# Patient Record
Sex: FEMALE | Race: WHITE | HISPANIC OR LATINO | Employment: OTHER | URBAN - METROPOLITAN AREA
[De-identification: names, ages, dates, MRNs, and addresses within clinical notes are randomized per-mention and may not be internally consistent; named-entity substitution may affect disease eponyms.]

---

## 2017-03-27 ENCOUNTER — INITIAL CONSULT (OUTPATIENT)
Dept: OPHTHALMOLOGY | Facility: CLINIC | Age: 52
End: 2017-03-27
Payer: MEDICAID

## 2017-03-27 DIAGNOSIS — J01.10 ACUTE NON-RECURRENT FRONTAL SINUSITIS: ICD-10-CM

## 2017-03-27 PROCEDURE — 99999 PR PBB SHADOW E&M-NEW PATIENT-LVL II: CPT | Mod: PBBFAC,,, | Performed by: OPHTHALMOLOGY

## 2017-03-27 PROCEDURE — 92004 COMPRE OPH EXAM NEW PT 1/>: CPT | Mod: S$PBB,,, | Performed by: OPHTHALMOLOGY

## 2017-03-27 PROCEDURE — 99202 OFFICE O/P NEW SF 15 MIN: CPT | Mod: PBBFAC | Performed by: OPHTHALMOLOGY

## 2017-03-27 RX ORDER — LISINOPRIL AND HYDROCHLOROTHIAZIDE 12.5; 2 MG/1; MG/1
2 TABLET ORAL DAILY
Refills: 2 | COMMUNITY
Start: 2017-03-15

## 2017-03-27 RX ORDER — LEVOTHYROXINE SODIUM 88 UG/1
88 TABLET ORAL DAILY
Refills: 0 | COMMUNITY
Start: 2017-03-22

## 2017-03-27 RX ORDER — CITALOPRAM 10 MG/1
10 TABLET ORAL DAILY
Refills: 6 | COMMUNITY
Start: 2017-03-22

## 2017-03-27 RX ORDER — TRAZODONE HYDROCHLORIDE 50 MG/1
TABLET ORAL
Refills: 6 | COMMUNITY
Start: 2017-03-22

## 2017-03-27 RX ORDER — AMLODIPINE BESYLATE 10 MG/1
10 TABLET ORAL DAILY
Refills: 6 | COMMUNITY
Start: 2017-03-22

## 2017-03-27 RX ORDER — HYDROXYCHLOROQUINE SULFATE 200 MG/1
200 TABLET, FILM COATED ORAL DAILY
Refills: 2 | COMMUNITY
Start: 2017-03-22

## 2017-03-27 RX ORDER — TOBRAMYCIN 3 MG/ML
SOLUTION/ DROPS OPHTHALMIC
Refills: 0 | COMMUNITY
Start: 2017-03-10

## 2017-03-27 RX ORDER — ALBUTEROL SULFATE 90 UG/1
AEROSOL, METERED RESPIRATORY (INHALATION)
Refills: 1 | COMMUNITY
Start: 2017-03-22

## 2017-03-27 RX ORDER — CLOTRIMAZOLE 1 %
1 CREAM (GRAM) TOPICAL 2 TIMES DAILY
Refills: 3 | COMMUNITY
Start: 2017-03-24

## 2017-03-27 RX ORDER — KETOCONAZOLE 20 MG/ML
SHAMPOO, SUSPENSION TOPICAL
Refills: 3 | COMMUNITY
Start: 2017-01-20

## 2017-03-27 NOTE — PATIENT INSTRUCTIONS
Try over the counter sinus medication.  If does not work, Check in with Dr. Springer regarding possible treatment. Return to me as needed.

## 2017-03-27 NOTE — MR AVS SNAPSHOT
[facility]  [address]  [place] [address]  Phone: [phone]  Fax: [phone]                  Corbin Vaughan   3/27/2017 1:00 PM   Initial consult    Description:  Female : 1965   Provider:  Raúl Langley MD   Department:  [facility]           Reason for Visit     Concerns About Ocular Health           Diagnoses this Visit        Comments    Acute non-recurrent frontal sinusitis                To Do List           Goals (5 Years of Data)     None      Follow-Up and Disposition     Return if symptoms worsen or fail to improve.      [facility]     [facility] -  Assistance  Registered nurses in the [facility] provide clinical advisement, health education, appointment booking, and other advisory services.  Call for this free service at [phone].             Medications           Message regarding Medications     Verify the changes and/or additions to your medication regime listed below are the same as discussed with your clinician today.  If any of these changes or additions are incorrect, please notify your healthcare provider.             Verify that the below list of medications is an accurate representation of the medications you are currently taking.  If none reported, the list may be blank. If incorrect, please contact your healthcare provider. Carry this list with you in case of emergency.           Current Medications     amlodipine (NORVASC) 10 MG tablet Take 10 mg by mouth once daily.    citalopram (CELEXA) 10 MG tablet Take 10 mg by mouth once daily.    clotrimazole (LOTRIMIN) 1 % cream 1 application 2 (two) times daily. Apply to affected area    hydroxychloroquine (PLAQUENIL) 200 mg tablet Take 200 mg by mouth once daily.    ketoconazole (NIZORAL) 2 % shampoo APPLY EVERY DAY    levothyroxine (SYNTHROID) 88 MCG tablet Take 88 mcg by mouth once daily.    lisinopril-hydrochlorothiazide  (PRINZIDE,ZESTORETIC) 20-12.5 mg per tablet Take 2 tablets by mouth once daily.    tobramycin sulfate 0.3% (TOBREX) 0.3 % ophthalmic solution instill 1 drop IN THE AFFECTED EYE THREE TIMES DAILY    trazodone (DESYREL) 50 MG tablet TAKE ONE TABLET BY MOUTH EVERYDAY AT BEDTIME    VENTOLIN HFA 90 mcg/actuation inhaler INHALE 2 PUFFS BY MOUTH EVERY 4 HOURS AS NEEDED FOR WHEEZING           Clinical Reference Information           Allergies as of 3/27/2017     No Known Allergies      Immunizations Administered on Date of Encounter - 3/27/2017     None      [facility] Sign-Up     Activating your [facility] account is as easy as 1-2-3!     1) Visit [email or web address], select Sign Up Now, enter this activation code and your date of birth, then select Next.  [email or web address]  Expires: 5/11/2017  2:17 PM      2) Create a username and password to use when you visit [facility] in the future and select a security question in case you lose your password and select Next.    3) Enter your e-mail address and click Sign Up!    Additional Information  If you have questions, please e-mail [email or web address] or call [phone] to talk to our [facility] staff. Remember, [facility] is NOT to be used for urgent needs. For medical emergencies, dial 911.         Instructions    Try over the counter sinus medication.  If does not work, Check in with Dr. Springer regarding possible treatment. Return to me as needed.       Language Assistance Services     ATTENTION: Language assistance services are available, free of charge. Please call [phone].      ATENCIÓN: Si habla español, tiene a landry disposición servicios gratuitos de asistencia lingüística. Llame al [phone].     MELINDA Ý: N?u b?n nói Ti?ng Vi?t, có các d?ch v? h? tr? ngôn ng? mi?n phí dành cho b?n. G?i s? [phone].         [facility] complies with applicable Federal civil rights laws and does not discriminate on the basis of race, color, national origin, age,  disability, or sex.

## 2017-03-27 NOTE — PROGRESS NOTES
HPI     Concerns About Ocular Health    Additional comments: Headaches           Comments   Referred by Dr.Angela Elizondo OD Headaches  Pt states OU very dry and burning. Experiencing Headaches past 2 weeks on   and off. OU itchy and blurry.  No pain.    States HA have greatly improved since seeing Dr. Elizondo. States MAGALLANES wake her   from sleep and eyes turn very red. Temporal location. Take Tylenol with no   relief. Last for several hours. (-) Nausea (-) Vomiting 7/10 on the pain   scale.     Eye Drops:OTC wallace QHS OU                     Tobramycin TID OU                      Alaway BID OU               I have personally interviewed the patient, reviewed the history and   examined the patient and agree with the technician's &/or resident's exam,   assessment and plan.       Last edited by Raúl Langley MD on 3/27/2017  1:39 PM. (History)        ROS     Positive for: Endocrine, Cardiovascular, Respiratory    Negative for: Constitutional, Gastrointestinal, Neurological, Skin,   Genitourinary, Musculoskeletal, HENT, Eyes, Psychiatric, Allergic/Imm,   Heme/Lymph    Last edited by Raúl Langley MD on 3/27/2017  1:39 PM. (History)        Assessment /Plan     For exam results, see Encounter Report.    Acute non-recurrent frontal sinusitis      The history and findings are most consistent with sinusitis. I recommended trying over the counter sinus medication. If that does not work, she should see her PCP. Return to me as requested.

## 2017-06-26 PROBLEM — J01.10 ACUTE NON-RECURRENT FRONTAL SINUSITIS: Status: RESOLVED | Noted: 2017-03-27 | Resolved: 2017-06-26

## 2021-11-05 ENCOUNTER — CLINICAL SUPPORT (OUTPATIENT)
Dept: URGENT CARE | Facility: CLINIC | Age: 56
End: 2021-11-05
Payer: MEDICAID

## 2021-11-05 DIAGNOSIS — Z78.9 NO KNOWN HEALTH PROBLEMS: Primary | ICD-10-CM

## 2021-11-05 LAB
CTP QC/QA: YES
SARS-COV-2 RDRP RESP QL NAA+PROBE: NEGATIVE

## 2021-11-05 PROCEDURE — 99211 OFF/OP EST MAY X REQ PHY/QHP: CPT | Mod: S$GLB,,, | Performed by: PHYSICIAN ASSISTANT

## 2021-11-05 PROCEDURE — U0002 COVID-19 LAB TEST NON-CDC: HCPCS | Mod: QW,S$GLB,, | Performed by: PHYSICIAN ASSISTANT

## 2021-11-05 PROCEDURE — U0002: ICD-10-PCS | Mod: QW,S$GLB,, | Performed by: PHYSICIAN ASSISTANT

## 2021-11-05 PROCEDURE — 99211 PR OFFICE/OUTPT VISIT, EST, LEVL I: ICD-10-PCS | Mod: S$GLB,,, | Performed by: PHYSICIAN ASSISTANT
